# Patient Record
Sex: MALE | Race: WHITE | URBAN - METROPOLITAN AREA
[De-identification: names, ages, dates, MRNs, and addresses within clinical notes are randomized per-mention and may not be internally consistent; named-entity substitution may affect disease eponyms.]

---

## 2018-02-20 ENCOUNTER — HISTORICAL (OUTPATIENT)
Dept: ADMINISTRATIVE | Facility: HOSPITAL | Age: 68
End: 2018-02-20

## 2021-11-09 ENCOUNTER — HISTORICAL (OUTPATIENT)
Dept: ADMINISTRATIVE | Facility: HOSPITAL | Age: 71
End: 2021-11-09

## 2021-11-09 LAB
ABS NEUT (OLG): 3.78 X10(3)/MCL (ref 2.1–9.2)
ALBUMIN SERPL-MCNC: 4.1 GM/DL (ref 3.4–4.8)
ALBUMIN/GLOB SERPL: 1.4 RATIO (ref 1.1–2)
ALP SERPL-CCNC: 59 UNIT/L (ref 40–150)
ALT SERPL-CCNC: 30 UNIT/L (ref 0–55)
AST SERPL-CCNC: 17 UNIT/L (ref 5–34)
BASOPHILS # BLD AUTO: 0.1 X10(3)/MCL (ref 0–0.2)
BASOPHILS NFR BLD AUTO: 1 %
BILIRUB SERPL-MCNC: 0.9 MG/DL
BILIRUBIN DIRECT+TOT PNL SERPL-MCNC: 0.4 MG/DL (ref 0–0.5)
BILIRUBIN DIRECT+TOT PNL SERPL-MCNC: 0.5 MG/DL (ref 0–0.8)
BUN SERPL-MCNC: 17.8 MG/DL (ref 8.4–25.7)
CALCIUM SERPL-MCNC: 10.1 MG/DL (ref 8.7–10.5)
CHLORIDE SERPL-SCNC: 103 MMOL/L (ref 98–107)
CO2 SERPL-SCNC: 30 MMOL/L (ref 23–31)
CREAT SERPL-MCNC: 0.85 MG/DL (ref 0.73–1.18)
EOSINOPHIL # BLD AUTO: 0.3 X10(3)/MCL (ref 0–0.9)
EOSINOPHIL NFR BLD AUTO: 4 %
ERYTHROCYTE [DISTWIDTH] IN BLOOD BY AUTOMATED COUNT: 13.1 % (ref 11.5–17)
GLOBULIN SER-MCNC: 3 GM/DL (ref 2.4–3.5)
GLUCOSE SERPL-MCNC: 78 MG/DL (ref 82–115)
HCT VFR BLD AUTO: 47.6 % (ref 42–52)
HGB BLD-MCNC: 15.8 GM/DL (ref 14–18)
LYMPHOCYTES # BLD AUTO: 2.1 X10(3)/MCL (ref 0.6–4.6)
LYMPHOCYTES NFR BLD AUTO: 31 %
MCH RBC QN AUTO: 29.6 PG (ref 27–31)
MCHC RBC AUTO-ENTMCNC: 33.2 GM/DL (ref 33–36)
MCV RBC AUTO: 89.3 FL (ref 80–94)
MONOCYTES # BLD AUTO: 0.6 X10(3)/MCL (ref 0.1–1.3)
MONOCYTES NFR BLD AUTO: 9 %
NEUTROPHILS # BLD AUTO: 3.78 X10(3)/MCL (ref 2.1–9.2)
NEUTROPHILS NFR BLD AUTO: 55 %
PLATELET # BLD AUTO: 213 X10(3)/MCL (ref 130–400)
PMV BLD AUTO: 11.7 FL (ref 9.4–12.4)
POTASSIUM SERPL-SCNC: 4.3 MMOL/L (ref 3.5–5.1)
PROT SERPL-MCNC: 7.1 GM/DL (ref 5.8–7.6)
RBC # BLD AUTO: 5.33 X10(6)/MCL (ref 4.7–6.1)
SODIUM SERPL-SCNC: 141 MMOL/L (ref 136–145)
WBC # SPEC AUTO: 6.9 X10(3)/MCL (ref 4.5–11.5)

## 2022-04-30 NOTE — OP NOTE
Patient:   Kamila Serra             MRN: [id number]            FIN: [id number]               Age:   68 years     Sex:  Male     :  1950   Associated Diagnoses:   None   Author:   Richard Guzman MD      Operative Note   Operative Information   Surgeon: Richard Guzman MD.     Preoperative Diagnosis: Nuclear sclerotic cataract                OS    Postoperative Diagnosis:  Same    Anesthesia:   Local   MAC      The patient was diagnosed with a significant cataract.       OS    The patient desired and I recommended surgical correction with laser-assisted cataract surgery.  After the patient's pupil was dilated and anesthetic drops were placed in the eye, ink marks were placed at 0 degrees, 90 degrees, 180 degrees on the conjunctivae using a titanium marker.     After the patient was transferred to the laser, properly positioned on the laser bed and the plan was reviewed, the Laser Optic Interface (L.O.I.) was aligned with the pre-placed marks and vacuum was employed.  Basic saline solution was used to fill the L.O.I. to the desired degree and the patient was aligned underneath the laser.  The control knob was used to raise the patient and insert the laser lens into the L.O.I.  The monitor was viewed for any bubbles that may interfere and corrections were made if necessary.  The L.O.I. was captured and locked and the eye was scanned.  After approval of the scan, the pre-programmed laser treatment was performed and completed.      The treatment was well-tolerated by the patient without any complications.  The patient was then transferred back to her stretcher and moved to the operating room.     Once in the operating room the patient was properly positioned and the head secured with paper tape.  After the microscope was positioned temporally and focused, theOS eye was prepped and draped in sterile fashion.  After I was scrubbed, gowned, and gloved I began the operation by creating small paracentesis  port at the corneal limbus to accommodate my second instrument.  Dispersive viscoelastic was then used to fill the anterior chamber.  A Sinsky hook was then used to open the preplaced laser incision.  . The cystatome blade was then used to initiate a capsulorrhexis which was completed 360 degrees with Utrata forceps. BSS in an AC cannula was then used to perform hydrodissection and hydrodilineation. Phacoemulsification was then accomplished by creating a deep groove down the middle of the nucleus, then  it into 2 halves with the phacotip and the Gabriel chopper and finishing the removal with a stop and chop technique.  The cortex was then removed using the I and A unit. All of the lens material was removed without any tears to the anterior or posterior capsule. Cohesive Viscoelastic was then injected to inflate the capsular bag. A foldable lens was then injected into the capsular bag. The lens was observed to be securely placed into the bag. The I and A was then used to remove the remaining viscoelastic. A 10-0 Biosorb incisional suture was not placed. BSS through an A/C cannula was used to perform stromal hydration in the wound. BSS was also used again to reform the chamber and bring the eye to physiologic IOP.  The wound was checked for leaks and none were found. Copious Vigomox drop and 1-2 drops of, Prednisolone Acetate 1% were placed topically prior to removing the lid specular and drapes.  The drapes were then removed. The patient will be sent to recovery and instructed to use Vigamox, Ketorolac, and Predinsolone Acetate 1% three times a day as well as follow all instructions on the postoperative sheet given to them and explained after surgery. The patient will return to see Dr. Guzman at their scheduled appointment within 24-36 hours after surgery.        LEMUEL Rader/le